# Patient Record
Sex: FEMALE | URBAN - METROPOLITAN AREA
[De-identification: names, ages, dates, MRNs, and addresses within clinical notes are randomized per-mention and may not be internally consistent; named-entity substitution may affect disease eponyms.]

---

## 2019-10-29 ENCOUNTER — APPOINTMENT (OUTPATIENT)
Dept: URBAN - METROPOLITAN AREA SURGERY 18 | Age: 29
Setting detail: DERMATOLOGY
End: 2019-10-31

## 2019-10-29 DIAGNOSIS — Z41.9 ENCOUNTER FOR PROCEDURE FOR PURPOSES OTHER THAN REMEDYING HEALTH STATE, UNSPECIFIED: ICD-10-CM

## 2019-10-29 PROCEDURE — OTHER BOTOX: OTHER

## 2020-05-11 ENCOUNTER — RX ONLY (RX ONLY)
Age: 30
End: 2020-05-11

## 2020-05-11 RX ORDER — TRIAMCINOLONE ACETONIDE 1 MG/G
OINTMENT TOPICAL
Qty: 1 | Refills: 0 | Status: ERX | COMMUNITY
Start: 2020-05-11

## 2020-08-20 ENCOUNTER — RX ONLY (RX ONLY)
Age: 30
End: 2020-08-20

## 2020-08-20 RX ORDER — DOXYCYCLINE HYCLATE 100 MG/1
CAPSULE, GELATIN COATED ORAL
Qty: 60 | Refills: 0 | Status: ERX | COMMUNITY
Start: 2020-08-20

## 2020-11-02 ENCOUNTER — APPOINTMENT (OUTPATIENT)
Dept: URBAN - METROPOLITAN AREA SURGERY 18 | Age: 30
Setting detail: DERMATOLOGY
End: 2020-11-13

## 2020-11-02 DIAGNOSIS — Z41.9 ENCOUNTER FOR PROCEDURE FOR PURPOSES OTHER THAN REMEDYING HEALTH STATE, UNSPECIFIED: ICD-10-CM

## 2020-11-02 PROCEDURE — OTHER BOTOX (U OR CC): OTHER

## 2020-11-02 PROCEDURE — OTHER DIAGNOSIS COMMENT: OTHER

## 2020-11-02 NOTE — HPI: OTHER
Condition:: cosmetic
Please Describe Your Condition:: Pt is coming in for Botox for wrinkles on the face.  Denies  pregnancy or breast feeding.  No h/o neuromusclar diseases or ALS.  She has had botox in the past and has tolerated it well.